# Patient Record
Sex: FEMALE | URBAN - METROPOLITAN AREA
[De-identification: names, ages, dates, MRNs, and addresses within clinical notes are randomized per-mention and may not be internally consistent; named-entity substitution may affect disease eponyms.]

---

## 2020-10-01 ENCOUNTER — APPOINTMENT (RX ONLY)
Dept: URBAN - METROPOLITAN AREA CLINIC 41 | Facility: CLINIC | Age: 20
Setting detail: DERMATOLOGY
End: 2020-10-01

## 2020-10-01 DIAGNOSIS — L81.8 OTHER SPECIFIED DISORDERS OF PIGMENTATION: ICD-10-CM

## 2020-10-01 DIAGNOSIS — D36.1 BENIGN NEOPLASM OF PERIPHERAL NERVES AND AUTONOMIC NERVOUS SYSTEM: ICD-10-CM

## 2020-10-01 DIAGNOSIS — L50.1 IDIOPATHIC URTICARIA: ICD-10-CM

## 2020-10-01 DIAGNOSIS — L71.8 OTHER ROSACEA: ICD-10-CM

## 2020-10-01 PROBLEM — D36.11 BENIGN NEOPLASM OF PERIPHERAL NERVES AND AUTONOMIC NERVOUS SYSTEM OF FACE, HEAD, AND NECK: Status: ACTIVE | Noted: 2020-10-01

## 2020-10-01 PROCEDURE — ? COUNSELING

## 2020-10-01 PROCEDURE — 99202 OFFICE O/P NEW SF 15 MIN: CPT

## 2020-10-01 PROCEDURE — ? PRESCRIPTION

## 2020-10-01 PROCEDURE — ? TREATMENT REGIMEN

## 2020-10-01 RX ORDER — SODIUM SULFACETAMIDE AND SULFUR 80; 40 MG/ML; MG/ML
LOTION TOPICAL QD
Qty: 1 | Refills: 11 | Status: ERX | COMMUNITY
Start: 2020-10-01

## 2020-10-01 RX ORDER — AZELAIC ACID 0.15 G/G
GEL TOPICAL BID
Qty: 1 | Refills: 6 | Status: ERX | COMMUNITY
Start: 2020-10-01

## 2020-10-01 RX ADMIN — SODIUM SULFACETAMIDE AND SULFUR: 80; 40 LOTION TOPICAL at 00:00

## 2020-10-01 RX ADMIN — AZELAIC ACID: 0.15 GEL TOPICAL at 00:00

## 2020-10-01 ASSESSMENT — LOCATION SIMPLE DESCRIPTION DERM
LOCATION SIMPLE: LEFT FOREARM
LOCATION SIMPLE: LEFT CHEEK
LOCATION SIMPLE: LEFT FOREHEAD
LOCATION SIMPLE: LEFT ZYGOMA

## 2020-10-01 ASSESSMENT — LOCATION DETAILED DESCRIPTION DERM
LOCATION DETAILED: LEFT CENTRAL ZYGOMA
LOCATION DETAILED: LEFT INFERIOR MEDIAL FOREHEAD
LOCATION DETAILED: LEFT VENTRAL PROXIMAL FOREARM
LOCATION DETAILED: LEFT LATERAL MALAR CHEEK

## 2020-10-01 ASSESSMENT — LOCATION ZONE DERM
LOCATION ZONE: ARM
LOCATION ZONE: FACE

## 2020-10-01 NOTE — HPI: RASH
What Type Of Note Output Would You Prefer (Optional)?: Standard Output
How Severe Is Your Rash?: moderate
Is This A New Presentation, Or A Follow-Up?: Rash
Additional History: Pt was recently treated for hives and is currently taking Prednisone, Zyrtec, zofran, Pepcid and benadryl

## 2020-10-01 NOTE — PROCEDURE: TREATMENT REGIMEN
Detail Level: Generalized
Continue Regimen: With current treatment of antihistamines for 3 months, finish out prednisone, allergy evaluation by allergist as planned.

## 2020-10-01 NOTE — PROCEDURE: COUNSELING
Detail Level: Simple
Patient Specific Counseling (Will Not Stick From Patient To Patient): Recommended Laroche Posey  and moisturizer
Detail Level: Zone